# Patient Record
Sex: MALE | Race: BLACK OR AFRICAN AMERICAN | NOT HISPANIC OR LATINO | Employment: FULL TIME | URBAN - METROPOLITAN AREA
[De-identification: names, ages, dates, MRNs, and addresses within clinical notes are randomized per-mention and may not be internally consistent; named-entity substitution may affect disease eponyms.]

---

## 2019-03-20 ENCOUNTER — HOSPITAL ENCOUNTER (EMERGENCY)
Facility: MEDICAL CENTER | Age: 49
End: 2019-03-21
Attending: EMERGENCY MEDICINE
Payer: COMMERCIAL

## 2019-03-20 DIAGNOSIS — R44.8 FACIAL PRESSURE: ICD-10-CM

## 2019-03-20 DIAGNOSIS — I10 HYPERTENSION, UNSPECIFIED TYPE: ICD-10-CM

## 2019-03-20 PROCEDURE — 700101 HCHG RX REV CODE 250: Performed by: EMERGENCY MEDICINE

## 2019-03-20 PROCEDURE — 99284 EMERGENCY DEPT VISIT MOD MDM: CPT

## 2019-03-20 RX ORDER — LOSARTAN POTASSIUM 25 MG/1
25 TABLET ORAL DAILY
COMMUNITY

## 2019-03-20 RX ORDER — PROPARACAINE HYDROCHLORIDE 5 MG/ML
1 SOLUTION/ DROPS OPHTHALMIC ONCE
Status: COMPLETED | OUTPATIENT
Start: 2019-03-20 | End: 2019-03-20

## 2019-03-20 RX ADMIN — PROPARACAINE HYDROCHLORIDE 1 DROP: 5 SOLUTION/ DROPS OPHTHALMIC at 23:45

## 2019-03-21 VITALS
TEMPERATURE: 97.4 F | HEIGHT: 72 IN | OXYGEN SATURATION: 97 % | HEART RATE: 58 BPM | BODY MASS INDEX: 32.73 KG/M2 | SYSTOLIC BLOOD PRESSURE: 145 MMHG | WEIGHT: 241.62 LBS | DIASTOLIC BLOOD PRESSURE: 95 MMHG | RESPIRATION RATE: 18 BRPM

## 2019-03-21 RX ORDER — FLUTICASONE PROPIONATE 50 MCG
2 SPRAY, SUSPENSION (ML) NASAL DAILY
Qty: 16 G | Refills: 0 | Status: SHIPPED | OUTPATIENT
Start: 2019-03-21

## 2019-03-21 NOTE — DISCHARGE INSTRUCTIONS
Your physical exam here in the emergency department is consistent with some sinus congestion.  Your intraocular pressures are within normal limits for you.  You have no pain with movements of your eyes, and he has no acute vision changes.  Your neurologic exam is normal save for your baseline deficits on the right side of face from your past Bell's palsy.  Please take the prescribed medications to help irrigate your nose and to control your symptoms.  Use saline rinses for nasal irrigation.  You can purchase these at any pharmacy.  If you have no improvement in your symptoms, sudden vision changes, double vision, pain with movement of your eyes, or any other concerns, then return to the emergency department for further evaluation.  Please keep your previously arranged appointment with your PCP for next week.

## 2019-03-21 NOTE — ED NOTES
Pt discharged with instructions and 1 script.  Pt instructed to follow up with PCP and return if worse. Pt verbalized understanding of discharge instructions.  Pt ambulated out of ED alone with steady gait.

## 2019-03-21 NOTE — ED PROVIDER NOTES
ED Provider Note    CHIEF COMPLAINT  Chief Complaint   Patient presents with   • Other       HPI  Cuate Garcia is a 49 y.o. male with past medical hx hypertension who presents intermittent pressure above right eye ongoing for the past day, associated with some elevated blood pressures, systolic 140, so presents for evaluation.  Did some research and is concerned that this might be indicative of a stroke or temporal arteritis.  Past medical history is notable for Bell's palsy with some residual right-sided facial droop, as well as some mildly elevated intraocular pressures on the right, per patient baseline intraocular pressures is about 20-24 on that side.  No double vision, no pain with extraocular motions, no persistent headache, no tinnitus, no neck pain.  No weakness, numbness, or tingling.  No chest pain, diaphoresis, nausea, or vomiting.  Did have some symptoms on the right side of the face 2 days ago, noted some increased pain in his cheek with chewing.  Today noted some sense of pressure above the right eye, worse when he was outside, improved when he came inside.  The symptoms are recurrent today, with a sense of pressure in his known documented hypertension he called the nurse helpline and they suggested he come in for evaluation.    REVIEW OF SYSTEMS  Negative except per HPI.     PAST MEDICAL HISTORY   has a past medical history of Hypertension.    SOCIAL HISTORY  Social History     Social History Main Topics   • Smoking status: Never Smoker   • Smokeless tobacco: Never Used   • Alcohol use No   • Drug use: No   • Sexual activity: Not on file       SURGICAL HISTORY  patient denies any surgical history    CURRENT MEDICATIONS  Home Medications     Reviewed by Elijah Willams R.N. (Registered Nurse) on 03/20/19 at 2223  Med List Status: Partial   Medication Last Dose Status   losartan (COZAAR) 25 MG Tab  Active                ALLERGIES  No Known Allergies    PHYSICAL EXAM  VITAL SIGNS: /95   Pulse  85   Temp 36.3 °C (97.4 °F) (Temporal)   Resp 18   Ht 1.829 m (6')   Wt 109.6 kg (241 lb 10 oz)   SpO2 98%   BMI 32.77 kg/m²   Pulse ox interpretation: I interpret this pulse ox as normal.  Constitutional: Alert in no apparent distress   HEENT: EOMI, PERRL, no nystagmus  IOP: OD: 24 OS:20  No tenderness with palpation of frontal or maxillary sinuses.  Mild tenderness with percussion of the frontal sinus on the right side.  No tenderness with palpation of right temporal artery.  Boggy nasal turbinates bilaterally, congested and almost occluded on right side.  Unable to visualize right TM secondary to cerumen impaction, left TM normal.  Mucous membranes moist, posterior oropharynx within normal limits, uvula midline.  Neck: supple, no midline cervical tenderness   Cardiovascular:  Regular rate and rhythm without murmurs   Chest wall: No tenderness to palpation   Thorax & Lungs: clear to auscultation bilaterally, no increased WOB, no wheeze or rhonchi   Abdomen: Soft throughout without rebound or guarding, no CVAT   Skin: Warm, Dry, No erythema, no rash   Musculoskeletal: Good range of motion in all major joints.    Neurologic:  Alert and oriented, without focal deficits. Fluent speech.  Mild right-sided facial droop, specifically slight loss of right nasolabial fold and right eyelid droop as well as flattening of right forehead.  Remainder of neurologic exam within normal limits, specifically no visual field deficits, no a apparent pupillary defect, fluent speech, good strength throughout, normal gait, no pronator drift, negative Romberg, finger-nose-finger and heel-to-shin intact.  Psychiatric: Affect normal, Judgment normal, Mood normal.       COURSE & MEDICAL DECISION MAKING  Nursing notes and vital signs were reviewed. (See chart for details)  The patient's medical records were reviewed, history was obtained from the patient.    49-year-old male with past medical history of hypertension, some mild  right-sided residual deficits from past episode of Bell's palsy, mildly elevated right intraocular pressures at baseline, who presents for increased pressure above right in setting of elevated blood pressures at home.  Here in the emergency department patient with normal blood pressures.  Has no blurry vision, double vision, or other ocular complaints at this time.  Complains of some pain in location of right frontal sinus.  Physical exam is essentially within normal limits.  Baseline intraocular pressures, no nystagmus, and normal neurologic exam.  Findings are more consistent with potential sinus congestion than hemorrhage, stroke, venous occlusion, retinal detachment, temporal arteritis, glaucoma, or other acute process.  I spent a long time with the patient discussing his normal physical exam and attempting reassurance.  At this time I do not feel CT imaging of the brain, the face, or venous imaging of the brain is necessary given his normal neurologic exam.  I also do not feel that labs would be of any utility given patient's otherwise lack of symptoms, including no tenderness with palpation of the temporal artery, no focal neurologic findings outside of baseline mild right facial droop.  Symptoms are not consistent with a hypertensive emergency given lack of chest pain, lack of altered level of consciousness, and normal blood pressures.  Plan for symptomatic control with fluticasone, saline rinses, and patient given strict return precautions specifically to return immediately for double vision, blurry vision, change in vision, sudden increase in pain, or any other concerns.  At that time would proceed with further imaging and evaluation.  Patient to follow-up with his PCP, has appointment previously scheduled for later this week.  Patient discharged to home with prescription for fluticasone, strict return precautions, discharged home in good condition.      FINAL IMPRESSION  (R68.89) Facial pressure  (I10)  Hypertension, unspecified type      Electronically signed by: Moni Gale, 3/20/2019 11:04 PM      This dictation was created using voice recognition software. The accuracy of the dictation is limited to the abilities of the software. I expect there may be some errors of grammar and possibly content. The nursing notes were reviewed and certain aspects of this information were incorporated into this note.

## 2019-03-21 NOTE — ED NOTES
Pt reporting pressure behind right eye x 1 day. Denies chest pain, sob, fevers, body aches, chills. Reports his blood pressure was high at home with a reading of 140/70.

## 2019-04-02 ENCOUNTER — HOSPITAL ENCOUNTER (OUTPATIENT)
Dept: RADIOLOGY | Facility: MEDICAL CENTER | Age: 49
End: 2019-04-02
Attending: PSYCHIATRY & NEUROLOGY
Payer: COMMERCIAL

## 2019-04-02 DIAGNOSIS — G44.209 TENSION-TYPE HEADACHE, NOT INTRACTABLE, UNSPECIFIED CHRONICITY PATTERN: ICD-10-CM

## 2019-04-02 PROCEDURE — 700117 HCHG RX CONTRAST REV CODE 255: Performed by: PSYCHIATRY & NEUROLOGY

## 2019-04-02 PROCEDURE — A9585 GADOBUTROL INJECTION: HCPCS | Performed by: PSYCHIATRY & NEUROLOGY

## 2019-04-02 PROCEDURE — 70553 MRI BRAIN STEM W/O & W/DYE: CPT

## 2019-04-02 RX ORDER — GADOBUTROL 604.72 MG/ML
10 INJECTION INTRAVENOUS ONCE
Status: COMPLETED | OUTPATIENT
Start: 2019-04-02 | End: 2019-04-02

## 2019-04-02 RX ADMIN — GADOBUTROL 10 ML: 604.72 INJECTION INTRAVENOUS at 16:37
